# Patient Record
Sex: MALE | Race: WHITE | Employment: FULL TIME | URBAN - METROPOLITAN AREA
[De-identification: names, ages, dates, MRNs, and addresses within clinical notes are randomized per-mention and may not be internally consistent; named-entity substitution may affect disease eponyms.]

---

## 2018-12-21 ENCOUNTER — APPOINTMENT (OUTPATIENT)
Dept: GENERAL RADIOLOGY | Age: 48
End: 2018-12-21
Payer: COMMERCIAL

## 2018-12-21 ENCOUNTER — HOSPITAL ENCOUNTER (EMERGENCY)
Age: 48
Discharge: HOME | End: 2018-12-21
Payer: COMMERCIAL

## 2018-12-21 VITALS
WEIGHT: 235 LBS | TEMPERATURE: 98.4 F | RESPIRATION RATE: 18 BRPM | OXYGEN SATURATION: 96 % | HEART RATE: 88 BPM | SYSTOLIC BLOOD PRESSURE: 152 MMHG | BODY MASS INDEX: 31.83 KG/M2 | DIASTOLIC BLOOD PRESSURE: 100 MMHG | HEIGHT: 72 IN

## 2018-12-21 DIAGNOSIS — S61.214A LACERATION OF RIGHT RING FINGER WITHOUT FOREIGN BODY WITHOUT DAMAGE TO NAIL, INITIAL ENCOUNTER: Primary | ICD-10-CM

## 2018-12-21 DIAGNOSIS — S67.194A CRUSHING INJURY OF RIGHT RING FINGER, INITIAL ENCOUNTER: ICD-10-CM

## 2018-12-21 PROCEDURE — 73130 X-RAY EXAM OF HAND: CPT

## 2018-12-21 PROCEDURE — 12002 RPR S/N/AX/GEN/TRNK2.6-7.5CM: CPT

## 2018-12-21 PROCEDURE — 99282 EMERGENCY DEPT VISIT SF MDM: CPT

## 2018-12-21 PROCEDURE — 6370000000 HC RX 637 (ALT 250 FOR IP): Performed by: PHYSICIAN ASSISTANT

## 2018-12-21 RX ADMIN — BACITRACIN ZINC: 500 OINTMENT TOPICAL at 12:24

## 2018-12-21 ASSESSMENT — PAIN SCALES - GENERAL: PAINLEVEL_OUTOF10: 3

## 2018-12-21 NOTE — ED NOTES
The wound was cleansed and a layer of bacitracin was applied and covered with a non-adherent bandage. Tubex gauze was applied over the dressing.       Maggie Chong RN  12/21/18 2996

## 2018-12-21 NOTE — ED PROVIDER NOTES
the patient verbalized understanding to return immediately for new or worsening symptoms. Counseling: The emergency provider has spoken with the patient and discussed todays results, in addition to providing specific details for the plan of care and counseling regarding the diagnosis and prognosis. Questions are answered at this time and they are agreeable with the plan. Assessment     1. Laceration of right ring finger without foreign body without damage to nail, initial encounter    2. Crushing injury of right ring finger, initial encounter      Plan   Discharge to home  Patient condition is good    New Medications     New Prescriptions    NEOMYCIN-POLYMYXIN-PRAMOXINE (NEOSPORIN PLUS) 1 % CREAM    Apply topically 2 times daily. Electronically signed by Valdez Agiurre PA-C   DD: 12/21/18  **This report was transcribed using voice recognition software. Every effort was made to ensure accuracy; however, inadvertent computerized transcription errors may be present.   END OF ED PROVIDER NOTE        Valdez Aguirre PA-C  12/21/18 2302